# Patient Record
Sex: FEMALE | Race: WHITE | NOT HISPANIC OR LATINO | URBAN - METROPOLITAN AREA
[De-identification: names, ages, dates, MRNs, and addresses within clinical notes are randomized per-mention and may not be internally consistent; named-entity substitution may affect disease eponyms.]

---

## 2018-07-19 ENCOUNTER — TELEPHONE (OUTPATIENT)
Dept: GASTROENTEROLOGY | Facility: CLINIC | Age: 83
End: 2018-07-19

## 2018-07-19 NOTE — TELEPHONE ENCOUNTER
----- Message from Kimberley Miner sent at 7/19/2018  3:02 PM CDT -----  Contact: Pt son Arvind can be reached at [phone]  Arvind is calling to speak with the nurse for pt constant diarrhea.  He would like to see what other options pt might have pt is currently in nursing home      Pt has an bacteria infection called Stenotrophomonas  maltophilia    Thank you!

## 2018-07-19 NOTE — TELEPHONE ENCOUNTER
Spoke with son, Arvind.  Aware our department does not treat the bacteria infection he is referring to.  Recommended he contact the infectious disease department.  Arvind expressed understanding.